# Patient Record
Sex: FEMALE | Race: WHITE
[De-identification: names, ages, dates, MRNs, and addresses within clinical notes are randomized per-mention and may not be internally consistent; named-entity substitution may affect disease eponyms.]

---

## 2020-07-15 ENCOUNTER — HOSPITAL ENCOUNTER (OUTPATIENT)
Dept: HOSPITAL 39 - MRI | Age: 75
End: 2020-07-15
Attending: FAMILY MEDICINE
Payer: MEDICARE

## 2020-07-15 DIAGNOSIS — M51.26: ICD-10-CM

## 2020-07-15 DIAGNOSIS — M51.36: ICD-10-CM

## 2020-07-15 DIAGNOSIS — M54.16: Primary | ICD-10-CM

## 2020-07-15 DIAGNOSIS — M43.16: ICD-10-CM

## 2020-07-15 DIAGNOSIS — M24.28: ICD-10-CM

## 2020-07-15 DIAGNOSIS — M48.062: ICD-10-CM

## 2020-07-15 DIAGNOSIS — E88.2: ICD-10-CM

## 2020-07-15 NOTE — MRI
EXAM DESCRIPTION: 

Lumbar Spine w/o Contrast : Magnetic Resonance Imaging.



CLINICAL HISTORY: 

radiculopathy



COMPARISON: 

LUMBAR



TECHNIQUE: 

Multiplanar, multiple standard sequences, non contrast MRI,

lumbar spine.



FINDINGS: 

L5-S1: The disc is well visualized on axial T2 series 501, image

3. Disc desiccation and minimal disc space loss. Tiny posterior

bulge. Degenerative hypertrophy of the bilateral facet joints and

flavum ligaments indices canal elements) more on the left. Mild

canal narrowing and epidural lipomatosis. Bilateral mild to

moderate foraminal narrowing.



L4-L5: Disc desiccation and minimal disc space loss. Grade 1

anterolisthesis 3 mm. Posterior disc bulge broad-based. Mild

hypertrophic changes of the canal elements. Bilateral narrowing

of the subarticular recesses. AP canal diameter 8 mm with

epidural lipomatosis. Bilateral borderline foraminal stenosis.



L3-L4: Disc desiccation and minimal disc space loss. Posterior

left paracentral disc protrusion 12 x 5 mm with extrusion below

the disc space 6 mm. Impinging of the thecal sac and effacement

of the left subarticular recess and impingement of the descending

left L4 nerve. Hypertrophic changes in the canal elements. Left

paracentral severe canal stenosis. Moderate to severe right

foraminal narrowing and moderate left foraminal narrowing.



L2-L3: Disc desiccation with disc space preserved and no bulging.

Minimal hypertrophic changes of the canal elements and mild canal

narrowing. Bilateral mild foraminal narrowing.



L1-L2: Disc desiccation minimal anterior bulging. Minimal

hypertrophic changes in the canal elements. Mild canal narrowing.

Bilateral mild foraminal narrowing. Conus terminates just above

the disc space.



T12-L1: Normal signal in the disc with no bulging. Disc space

maintained. Canal elements unremarkable. Canal and foramina are

patent.



Minimal mid lumbar levoscoliosis.  Paravertebral soft tissues

minimal paravertebral muscle atrophy.. Distal cord normal signal

and caliber.  Otherwise normal marrow signal in the remaining

vertebral bodies and the posterior elements. Vertebral bodies are

not compressed at any level.



IMPRESSION: 

1. Multilevel hypertrophic changes in the posterior flavum

ligaments and facet joints. Multilevel disc desiccation and disc

space loss.

2. Grade 1 anterolisthesis L4-L5 with posterior disc bulge. Mild

to moderate central canal stenosis with epidural lipomatosis and

bilateral narrowing of the subarticular recesses. Bilateral

borderline foraminal stenosis.

3. Left posterior herniation of the L3-4 disc with inferior

extrusion causing left subarticular recess stenosis and

impingement of the left L4 nerve. Severe left paracentral canal

stenosis.



Electronically signed by:  Ricki March MD  7/15/2020 7:56 PM CDT

Workstation: [id number]

## 2020-07-23 ENCOUNTER — HOSPITAL ENCOUNTER (OUTPATIENT)
Dept: HOSPITAL 39 - US | Age: 75
End: 2020-07-23
Attending: FAMILY MEDICINE
Payer: MEDICARE

## 2020-07-23 DIAGNOSIS — R05: ICD-10-CM

## 2020-07-23 DIAGNOSIS — R10.11: Primary | ICD-10-CM

## 2020-07-23 NOTE — US
EXAM DESCRIPTION: 

Abdomen,Limited:  ULTRASOUND.



CLINICAL HISTORY: 

75 years Female LEFT UPPER QUADRANT PAIN. Previous left upper

quadrant hernia.



COMPARISON: 

None Available.



TECHNIQUE: 

Transcutaneous scanning: Gray-scale and Doppler modes.



Impression: 

Scanning of the left upper quadrant. Abdominal wall is intact

with no hernia neck and no hernia sac in the subcutaneous

tissues. No dominant solid mass and no fluid collection. Spleen

unremarkable on limited views.



Electronically signed by:  Ricki March MD  7/23/2020 2:30 PM CDT

Workstation: [id number]

## 2020-07-23 NOTE — RAD
EXAM DESCRIPTION: 

Chest,2 Views: KARLI/



CLINICAL HISTORY: 

75 years Female CHRONIC COUGH



COMPARISON: 

2 view chest June 2017.



TECHNIQUE: 

Two views.  PA and Lateral.



FINDINGS: 

Lungs: Moderate inflation. Stable interstitial markings. No acute

infiltrate. Pleural spaces: No effusion or pneumothorax

bilaterally.

Heart: Left ventricular enlargement stable. Pulmonary

Vascularity: Not increased. Mediastinum: Not widened. Aorta:

Unremarkable.

Bony Thorax/Spine: No acute bony thoracic abnormalities. Narrowed

disc spaces.



IMPRESSION: Senescent chest with no radiographic evidence of

acute cardiopulmonary disease.



Electronically signed by:  Ricki March MD  7/23/2020 2:28 PM CDT

Workstation: [id number]

## 2020-10-05 ENCOUNTER — HOSPITAL ENCOUNTER (OUTPATIENT)
Dept: HOSPITAL 39 - AMB | Age: 75
Discharge: HOME | End: 2020-10-05
Attending: OPHTHALMOLOGY
Payer: MEDICARE

## 2020-10-05 DIAGNOSIS — Z88.6: ICD-10-CM

## 2020-10-05 DIAGNOSIS — I25.10: ICD-10-CM

## 2020-10-05 DIAGNOSIS — Z79.899: ICD-10-CM

## 2020-10-05 DIAGNOSIS — Z88.5: ICD-10-CM

## 2020-10-05 DIAGNOSIS — E66.9: ICD-10-CM

## 2020-10-05 DIAGNOSIS — Z88.8: ICD-10-CM

## 2020-10-05 DIAGNOSIS — H25.11: Primary | ICD-10-CM

## 2020-10-05 PROCEDURE — 66984 XCAPSL CTRC RMVL W/O ECP: CPT

## 2020-10-05 PROCEDURE — 00142 ANES PX ON EYE LENS SURGERY: CPT

## 2020-10-19 ENCOUNTER — HOSPITAL ENCOUNTER (OUTPATIENT)
Dept: HOSPITAL 39 - AMB | Age: 75
Discharge: HOME | End: 2020-10-19
Attending: OPHTHALMOLOGY
Payer: MEDICARE

## 2020-10-19 DIAGNOSIS — H25.12: Primary | ICD-10-CM

## 2020-10-19 DIAGNOSIS — Z79.899: ICD-10-CM

## 2020-10-19 DIAGNOSIS — Z88.6: ICD-10-CM

## 2020-10-19 DIAGNOSIS — I25.10: ICD-10-CM

## 2020-10-19 DIAGNOSIS — Z88.8: ICD-10-CM

## 2020-10-19 DIAGNOSIS — Z88.5: ICD-10-CM

## 2020-10-19 PROCEDURE — 00142 ANES PX ON EYE LENS SURGERY: CPT

## 2020-10-19 PROCEDURE — 66984 XCAPSL CTRC RMVL W/O ECP: CPT
